# Patient Record
Sex: MALE | Race: WHITE | Employment: OTHER | URBAN - METROPOLITAN AREA
[De-identification: names, ages, dates, MRNs, and addresses within clinical notes are randomized per-mention and may not be internally consistent; named-entity substitution may affect disease eponyms.]

---

## 2017-01-06 ENCOUNTER — HOSPITAL ENCOUNTER (OUTPATIENT)
Dept: CT IMAGING | Age: 62
Discharge: HOME | End: 2017-01-06
Attending: FAMILY MEDICINE
Payer: MEDICARE

## 2017-01-06 DIAGNOSIS — R05.9 COUGH: ICD-10-CM

## 2017-01-06 PROCEDURE — 71250 CT THORAX DX C-: CPT

## 2019-05-14 ENCOUNTER — HOSPITAL ENCOUNTER (OUTPATIENT)
Dept: PHYSICAL THERAPY | Age: 64
Discharge: HOME | End: 2019-05-14
Payer: MEDICARE

## 2019-05-14 PROCEDURE — 97166 OT EVAL MOD COMPLEX 45 MIN: CPT

## 2019-05-14 PROCEDURE — 97140 MANUAL THERAPY 1/> REGIONS: CPT

## 2019-05-14 NOTE — THERAPY EVALUATION
Chelsey Victor  : 1955  [place]  [place] [address] [facility]  [address], [address]  [phone]   [phone]         OUTPATIENT OCCUPATIONAL THERAPY: Initial Assessment and Daily Note 2019    ICD-10: Treatment Diagnosis: I89.0, Lymphedema not elsewhere classified  Q82.0, Hereditary or primary lymphedema  M79.604, Pain in right leg    Precautions/Allergies:   Norvasc [amlodipine] and Other medication   MD Orders: eval and treat MEDICAL/REFERRING DIAGNOSIS:   Lymphedema, not elsewhere classified [I89.0]   DATE OF ONSET: Chronic   REFERRING PHYSICIAN: Elton Chance MD  RETURN PHYSICIAN APPOINTMENT: to be determined      INITIAL ASSESSMENT:  Mr. Sheba Stevenson presents bilateral lower extremity lymphedema with hyperpigmentation, lymphostatic fibrosis, 3+ pitting edema, mild hyperkeratosis. Pt's swelling has increased since April and he is unable to wear shoes. Mr. Sheba Stevenson has had intermittent swelling for many years and is now developing tissue changes. No history of cellulitis or lymphorrhea. He has history of bilateral total knee replacements in , with new onset of right knee pain. He has seen a vascular surgeon and is negative for DVT and negative for CHF. He has been wearing Ready Wraps on his legs; however, the wraps are ill-fitting. He is an excellent candidate for complete decongestive therapy. Pt is in agreement with POC and goals. PLAN OF CARE:   PROBLEM LIST:  1. Increased Pain  2. Decreased Activity Tolerance  3. Decreased Flexibility/Joint Mobility  4. Decreased Knowledge of Precautions  5. Decreased Skin Integrity/Hygeine  6. Decreased [place] with Home Exercise Program INTERVENTIONS PLANNED  1. Skin care  2. Compression bandaging  3. Fitting for compression garment(s)  4. Manual therapy/Manual lymph drainage  5.   Therapeutic exercise/Therapeutic activities  6. Patient Education  7. Compression pump trial prn  8.  kinesiotaping    TREATMENT PLAN:  Effective Dates: 5/14/2019 TO 8/13/2019 (90 days). Frequency/Duration: 2 times a week for 90 Day(s)  Will adjust frequency and duration as progress indicates. GOALS: (Goals have been discussed and agreed upon with patient.)  Short-Term Functional Goals: Time Frame: 45 days  1. The patient/caregiver will verbalize understanding of lymphedema precautions. 2. Patient will be independent with skin care regimen to decrease risk of cellulitis. 3. The patient/caregiver will be independent at donning and doffing  lower extremity compression bandages. 4. The patient/caregiver will be independent with self-manual lymph drainage techniques and show decrease in limb volume. 5. Patient will be independent in lymphatic exercises. Discharge Goals: Time Frame: 90 days  1. Patient's bilateral lower extremity circumferential measurements will decrease 5% on volumetric graph to maximize functional use in ADL's.    2. The patient/caregiver will be independent with home management of lymphedema. 3. Patient/caregiver will be independent donning and doffing bilateral lower extremity compression garment. Rehabilitation Potential For Stated Goals: [address] therapy, I certify that the treatment plan above will be carried out by a therapist or under their direction. Thank you for this referral,  ELICEO Ruiz/L, CLT    Referring Physician Signature: Radha Gusman MD _________________________  Date _________            The information in this section was collected on 5/14/2019   (except where otherwise noted). OCCUPATIONAL PROFILE & HISTORY:   History of Present Injury/Illness (Reason for Referral):   Mr. Dev Bowie presents bilateral lower extremity lymphedema with hyperpigmentation, lymphostatic fibrosis, 3+ pitting edema, mild hyperkeratosis.   He has history of bilateral total knee replacements in 2010, with new onset of right knee pain. He has seen a vascular surgeon and is negative for DVT and negative for CHF. Mr. Elpidio Cameron has had intermittent swelling for many years and is now developing tissue changes  Past Medical History/Comorbidities:   Mr. Elpidio Cameron  has a past medical history of Arthritis, Asthma, Cancer ([address] (2000), COPD, Hypertension, Morbid obesity ([address] and Other ill-defined conditions. He also has no past medical history of Arrhythmia, Autoimmune disease ([address] CAD (coronary artery disease), Chronic kidney disease, Chronic pain, Coagulation defects, Dementia, Diabetes ([address] Difficult intubation, Endocrine disease, GERD (gastroesophageal reflux disease), Heart failure ([address] Infectious disease, Liver disease, Malignant hyperthermia due to anesthesia, Nausea & vomiting, Pseudocholinesterase deficiency, Psychiatric disorder, PUD (peptic ulcer disease), Seizures ([address] Stroke ([address] Thromboembolus ([address] Thyroid disease, Unspecified adverse effect of anesthesia, or Unspecified sleep apnea. Mr. Elpidio Cameron  has a past surgical history that includes hx other surgical; hx heent (2000); hx heent; hx orthopaedic (7-2010); hx hernia repair; and hx cholecystectomy (2001). Social History/Living Environment:    Pt lives with his wife  Prior Level of Function/Work/Activity:  Retired, independent with ADLS/IADLs  Dominant Side:         RIGHT  Previous Treatment Approaches:          Lasix and Ready Wraps; no therapy for lymphedema   Ambulatory/Rehab Services H2 Model Falls Risk Assessment    Risk Factors:       (1)  Gender [Male] Ability to Rise from Chair:       (1)  Pushes up, successful in one attempt    Falls Prevention Plan:       No modifications necessary   Total: (5 or greater = High Risk): 2    ©2010 [facility] States Patent #[id number].  Federal Law prohibits the replication, distribution or use without written permission from [facility] [employer] [facility]     Current Medications:    Current Outpatient Medications:     HYDROcodone-ibuprofen (VICOPROFEN) 5-200 mg per tablet, Take 1 Tab by mouth every six (6) hours as needed. , Disp: , Rfl:     lidocaine (LIDODERM) 5 %(700 mg/patch), 1 Patch by TransDERmal route every twenty-four (24) hours. , Disp: , Rfl:     Diclofenac Sodium (VOLTAREN) 1 % topical gel, Apply 4 g to affected area four (4) times daily as needed. , Disp: , Rfl:     acetaminophen (TYLENOL ARTHRITIS PAIN) 650 mg CR tablet, Take 650 mg by mouth every six (6) hours as needed. Indications: BACK PAIN, Disp: , Rfl:     tramadol-acetaminophen (ULTRACET) 37.5-325 mg per tablet, Take 2 Tabs by mouth every four (4) hours as needed. , Disp: , Rfl:     irbesartan (AVAPRO) 300 mg tablet, Take 300 mg by mouth every morning. Per anesthesia protocol:instructed to take am of surgery. , Disp: , Rfl:     ALBUTEROL IN, Take 2 Puffs by inhalation as needed. , Disp: , Rfl:     albuterol sulfate (PROVENTIL;VENTOLIN) 2.5 mg/0.5 mL Nebu, 2.5 mg by Nebulization route once., Disp: , Rfl:     montelukast (SINGULAIR) 10 mg tablet, Take 10 mg by mouth nightly., Disp: , Rfl:     ezetimibe (ZETIA) 10 mg tablet, Take 1 Tab by mouth every morning. Per anesthesia protocol:instructed to take am of surgery. , Disp: , Rfl:     THEOPHYLLINE ANHYDROUS (THEOPHYLLINE PO), Take 300 mg by mouth three (3) times daily. Per anesthesia protocol:instructed to take am of surgery. , Disp: , Rfl:             Date Last Reviewed:  5/14/2019     Complexity Level : Expanded review of therapy/medical records (1-2):  MODERATE COMPLEXITY   ASSESSMENT OF OCCUPATIONAL PERFORMANCE:   Palpation:          Pitting 3+ edema bilateral feet and ankles  ROM:          WFL  Strength:          Generalized deconditioning  Special Tests:           Positive Stemmer's sign (inability to pinch skin at base of second toe)   Skin Integrity:          Hyperpigmentation, lymphostatic fibrosis, milde hyperkeratosis  Sensation:  Right knee pain  Functional Mobility:  Independent ambulator  Activities of Daily Living Independent  Edema/Girth:  3+ and pitting   PRETREATMENT AFFECTED LIMB(s): right lower extremity  left lower extremity      Date:  5/14/19         Right / Left           Groin   []      []           8 inches   []      []           4 inches   []      []         PoplitealSpace   [x]      [x] 43.5/44.5          8 inches   [x]      [x] 45.5/48.5          4 inches   [x]      [x] 39/42          Ankle   [x]      [x] 34.5/33.5          Instep   [x]      [x] 26.5/27.5        Measurements are taken in centimeters:  2.54 cm = 1 inch  Cumulative Circumferential Volumetric Graph Measurements  RIGHT  cm        LEFT  cm               Physical Skills Involved:  1. Edema  2. Skin Integrity  3. Limb heaviness Cognitive Skills Affected (resulting in the inability to perform in a timely and safe manner):  1. N/A Psychosocial Skills Affected:  1. Habits/Routines   Number of elements that affect the Plan of Care: 3-5:  MODERATE COMPLEXITY   CLINICAL DECISION MAKING:   Outcome Measure: Tool Used: Lymphedema Life Impact Scale   Score:  Initial: 15 Most Recent: X (Date: -- )   Interpretation of Score: The Lymphedema Life Impact Scale (LLIS) is a validated instrument that measures the physical, functional, and psychosocial concerns pertinent to patients with extremity lymphedema. The Scale's questionnaire is administered to patients to gauge impairments, activity limitations, and participation restrictions resulting from their lymphedema. Score 0 1-13 14-26 27-40 41-54 55-67 68   Modifier CH CI CJ CK CL CM CN     ?  Other PT/OT Primary Functional Limitations:     - CURRENT STATUS: CJ - 20%-39% impaired, limited or restricted    - GOAL STATUS: CI - 1%-19% impaired, limited or restricted    - D/C STATUS:  ---------------To be determined---------------       Medical Necessity:   · Patient is expected to demonstrate progress in reduction of circumferential volumetric graph measurements to reduce risk of cellulitis. Reason for Services/Other Comments:  · Patient continues to require skilled intervention due to stage 2 primary lymphedema bilateral lower extremeities. Use of outcome tool(s) and clinical judgement create a POC that gives a: Questionable prediction of patient's progress: MODERATE COMPLEXITY   TREATMENT:   (In addition to Assessment/Re-Assessment sessions the following treatments were rendered)    Pre-treatment Symptoms/Complaints:  Swelling in bilateral legs, tissue changes, unable to wear shoes, right knee pain. Pain: Initial:   Pain Intensity 1: 10  Post Session:  10   Occupational Therapy Treatments:    OT eval( X  ) OT eval was completed. Pt received information on lymphedema and risk reduction/self management practices as outlined by the National Lymphed[employer] Network. Therapeutic Exercise ( minutes):     HEP:  As above; handouts given to patient for all exercises. Manual Therapy:( 30 minutes)   Pt was educated on lymphatic pathophysiology, complete decongestive therapy, precautions and skin integrity management. Manual Lymph Drainage:          Lymph Nodes:    Cervical Supraclavicular Axillary Abdominal Inguinal Popliteal Antecubital   RIGHT []     []     []     []     []     []     []       LEFT []     []     []     []     []     []     []          Anastamoses:   Axillo-axillary Inguino-inguinal Axillo-inguinal Inguino-axillary   ANTERIOR []     []     []     []       POSTERIOR []     []     []     []       RIGHT []     []     []     []       LEFT []     []     []     []         Limbs:   []    RUE     []    LUE     []    RLE    []    LLE   Compression: Provided surgigrip stockinette for light compression and placed Ready Wraps over surgigrp.   Pt has class 1 compression hose but is unable to don them due to increased size of legs.    Treatment/Session Assessment:    · Response to Treatment:  . In agreement with POC and goals  · Compliance with Program/Exercises: Will assess as treatment progresses. · Recommendations/Intent for next treatment session: Next visit will focus on phase 1 complete decongestive therapy.   Total Treatment Duration:  60 minutes  OT Patient Time In/Time Out  Time In: 0900  Time Out: 1000    Олег Tellez OTR/L, CLT

## 2019-05-15 ENCOUNTER — APPOINTMENT (OUTPATIENT)
Dept: PHYSICAL THERAPY | Age: 64
End: 2019-05-15
Payer: MEDICARE

## 2019-05-20 ENCOUNTER — HOSPITAL ENCOUNTER (OUTPATIENT)
Dept: PHYSICAL THERAPY | Age: 64
Discharge: HOME | End: 2019-05-20
Payer: MEDICARE

## 2019-05-20 PROCEDURE — 97140 MANUAL THERAPY 1/> REGIONS: CPT

## 2019-05-20 NOTE — PROGRESS NOTES
Warren Costello  : 1955  [place]  [place]: [place] [address] at [facility]  [address], [address]  [phone]   [phone]         OUTPATIENT OCCUPATIONAL THERAPY: Daily Note 2019    ICD-10: Treatment Diagnosis: I89.0, Lymphedema not elsewhere classified  Q82.0, Hereditary or primary lymphedema  M79.604, Pain in right leg    Precautions/Allergies:   Norvasc [amlodipine] and Other medication   MD Orders: eval and treat MEDICAL/REFERRING DIAGNOSIS:   Lymphedema, not elsewhere classified [I89.0]   DATE OF ONSET: Chronic   REFERRING PHYSICIAN: Brant Begum MD  RETURN PHYSICIAN APPOINTMENT: to be determined      INITIAL ASSESSMENT:  Mr. Nadeem Topete presents bilateral lower extremity lymphedema with hyperpigmentation, lymphostatic fibrosis, 3+ pitting edema, mild hyperkeratosis. Pt's swelling has increased since April and he is unable to wear shoes. Mr. Ndaeem Topete has had intermittent swelling for many years and is now developing tissue changes. No history of cellulitis or lymphorrhea. He has history of bilateral total knee replacements in , with new onset of right knee pain. He has seen a vascular surgeon and is negative for DVT and negative for CHF. He has been wearing Ready Wraps on his legs; however, the wraps are ill-fitting. He is an excellent candidate for complete decongestive therapy. Pt is in agreement with POC and goals. PLAN OF CARE:   PROBLEM LIST:  1. Increased Pain  2. Decreased Activity Tolerance  3. Decreased Flexibility/Joint Mobility  4. Decreased Knowledge of Precautions  5. Decreased Skin Integrity/Hygeine  6. Decreased Schleicher with Home Exercise Program INTERVENTIONS PLANNED  1. Skin care  2. Compression bandaging  3. Fitting for compression garment(s)  4. Manual therapy/Manual lymph drainage  5.   Therapeutic exercise/Therapeutic activities  6. Patient Education  7. Compression pump trial prn  8.  kinesiotaping    TREATMENT PLAN:  Effective Dates: 5/14/2019 TO 8/13/2019 (90 days). Frequency/Duration: 2 times a week for 90 Day(s)  Will adjust frequency and duration as progress indicates. GOALS: (Goals have been discussed and agreed upon with patient.)  Short-Term Functional Goals: Time Frame: 45 days  1. The patient/caregiver will verbalize understanding of lymphedema precautions. 2. Patient will be independent with skin care regimen to decrease risk of cellulitis. 3. The patient/caregiver will be independent at donning and doffing  lower extremity compression bandages. 4. The patient/caregiver will be independent with self-manual lymph drainage techniques and show decrease in limb volume. 5. Patient will be independent in lymphatic exercises. Discharge Goals: Time Frame: 90 days  1. Patient's bilateral lower extremity circumferential measurements will decrease 5% on volumetric graph to maximize functional use in ADL's.    2. The patient/caregiver will be independent with home management of lymphedema. 3. Patient/caregiver will be independent donning and doffing bilateral lower extremity compression garment. Rehabilitation Potential For Stated Goals: Fair              The information in this section was collected on 5/20/2019   (except where otherwise noted). OCCUPATIONAL PROFILE & HISTORY:   History of Present Injury/Illness (Reason for Referral):   Mr. Lili Solano presents bilateral lower extremity lymphedema with hyperpigmentation, lymphostatic fibrosis, 3+ pitting edema, mild hyperkeratosis. He has history of bilateral total knee replacements in 2010, with new onset of right knee pain. He has seen a vascular surgeon and is negative for DVT and negative for CHF.   Mr. Lili Solano has had intermittent swelling for many years and is now developing tissue changes  Past Medical History/Comorbidities:   Mr. Lili Solano has a past medical history of Arthritis, Asthma, Cancer ([facility] [address] (2000), COPD, Hypertension, Morbid obesity ([address] and Other ill-defined conditions. He also has no past medical history of Arrhythmia, Autoimmune disease ([address] CAD (coronary artery disease), Chronic kidney disease, Chronic pain, Coagulation defects, Dementia, Diabetes ([address] Difficult intubation, Endocrine disease, GERD (gastroesophageal reflux disease), Heart failure ([address] Infectious disease, Liver disease, Malignant hyperthermia due to anesthesia, Nausea & vomiting, Pseudocholinesterase deficiency, Psychiatric disorder, PUD (peptic ulcer disease), Seizures ([address] Stroke ([address] Thromboembolus ([facility] [address] Thyroid disease, Unspecified adverse effect of anesthesia, or Unspecified sleep apnea. Mr. Nadeem Topete  has a past surgical history that includes hx other surgical; hx heent (2000); hx heent; hx orthopaedic (7-2010); hx hernia repair; and hx cholecystectomy (2001). Social History/Living Environment:    Pt lives with his wife  Prior Level of Function/Work/Activity:  Retired, independent with ADLS/IADLs  Dominant Side:         RIGHT  Previous Treatment Approaches:          Lasix and Ready Wraps; no therapy for lymphedema   Ambulatory/Rehab Services H2 Model Falls Risk Assessment    Risk Factors:       (1)  Gender [Male] Ability to Rise from Chair:       (1)  Pushes up, successful in one attempt    Falls Prevention Plan:       No modifications necessary   Total: (5 or greater = High Risk): 2    ©2010 [facility] of [address] Patent #[id number]. Federal Law prohibits the replication, distribution or use without written permission from [facility] of [employer]     Current Medications:    Current Outpatient Medications:     HYDROcodone-ibuprofen (VICOPROFEN) 5-200 mg per tablet, Take 1 Tab by mouth every six (6) hours as needed.   , Disp: , Rfl:     lidocaine (LIDODERM) 5 %(700 mg/patch), 1 Patch by TransDERmal route every twenty-four (24) hours. , Disp: , Rfl:     Diclofenac Sodium (VOLTAREN) 1 % topical gel, Apply 4 g to affected area four (4) times daily as needed. , Disp: , Rfl:     acetaminophen (TYLENOL ARTHRITIS PAIN) 650 mg CR tablet, Take 650 mg by mouth every six (6) hours as needed. Indications: BACK PAIN, Disp: , Rfl:     tramadol-acetaminophen (ULTRACET) 37.5-325 mg per tablet, Take 2 Tabs by mouth every four (4) hours as needed. , Disp: , Rfl:     irbesartan (AVAPRO) 300 mg tablet, Take 300 mg by mouth every morning. Per anesthesia protocol:instructed to take am of surgery. , Disp: , Rfl:     ALBUTEROL IN, Take 2 Puffs by inhalation as needed. , Disp: , Rfl:     albuterol sulfate (PROVENTIL;VENTOLIN) 2.5 mg/0.5 mL Nebu, 2.5 mg by Nebulization route once., Disp: , Rfl:     montelukast (SINGULAIR) 10 mg tablet, Take 10 mg by mouth nightly., Disp: , Rfl:     ezetimibe (ZETIA) 10 mg tablet, Take 1 Tab by mouth every morning. Per anesthesia protocol:instructed to take am of surgery. , Disp: , Rfl:     THEOPHYLLINE ANHYDROUS (THEOPHYLLINE PO), Take 300 mg by mouth three (3) times daily. Per anesthesia protocol:instructed to take am of surgery. , Disp: , Rfl:             Date Last Reviewed:  5/20/2019     Complexity Level : Expanded review of therapy/medical records (1-2):  MODERATE COMPLEXITY   ASSESSMENT OF OCCUPATIONAL PERFORMANCE:   Palpation:          Pitting 3+ edema bilateral feet and ankles  ROM:          WFL  Strength:          Generalized deconditioning  Special Tests:           Positive Stemmer's sign (inability to pinch skin at base of second toe)   Skin Integrity:          Hyperpigmentation, lymphostatic fibrosis, milde hyperkeratosis  Sensation:  Right knee pain  Functional Mobility:  Independent ambulator  Activities of Daily Living Independent  Edema/Girth:  3+ and pitting   PRETREATMENT AFFECTED LIMB(s): right lower extremity  left lower extremity      Date:  5/14/19         Right / Left Groin   []      []           8 inches   []      []           4 inches   []      []         PoplitealSpace   [x]      [x] 43.5/44.5          8 inches   [x]      [x] 45.5/48.5          4 inches   [x]      [x] 39/42          Ankle   [x]      [x] 34.5/33.5          Instep   [x]      [x] 26.5/27.5        Measurements are taken in centimeters:  2.54 cm = 1 inch  Cumulative Circumferential Volumetric Graph Measurements  RIGHT  cm        LEFT  cm               Physical Skills Involved:  1. Edema  2. Skin Integrity  3. Limb heaviness Cognitive Skills Affected (resulting in the inability to perform in a timely and safe manner):  1. N/A Psychosocial Skills Affected:  1. Habits/Routines   Number of elements that affect the Plan of Care: 3-5:  MODERATE COMPLEXITY   CLINICAL DECISION MAKING:   Outcome Measure: Tool Used: Lymphedema Life Impact Scale   Score:  Initial: 15 Most Recent: X (Date: -- )   Interpretation of Score: The Lymphedema Life Impact Scale (LLIS) is a validated instrument that measures the physical, functional, and psychosocial concerns pertinent to patients with extremity lymphedema. The Scale's questionnaire is administered to patients to gauge impairments, activity limitations, and participation restrictions resulting from their lymphedema. Score 0 1-13 14-26 27-40 41-54 55-67 68   Modifier CH CI CJ CK CL CM CN     ? Other PT/OT Primary Functional Limitations:     - CURRENT STATUS: CJ - 20%-39% impaired, limited or restricted    - GOAL STATUS: CI - 1%-19% impaired, limited or restricted    - D/C STATUS:  ---------------To be determined---------------       Medical Necessity:   · Patient is expected to demonstrate progress in reduction of circumferential volumetric graph measurements to reduce risk of cellulitis. Reason for Services/Other Comments:  · Patient continues to require skilled intervention due to stage 2 primary lymphedema bilateral lower extremeities.    Use of outcome tool(s) and clinical judgement create a POC that gives a: Questionable prediction of patient's progress: MODERATE COMPLEXITY   TREATMENT:   (In addition to Assessment/Re-Assessment sessions the following treatments were rendered)    Pre-treatment Symptoms/Complaints:  Initiated CDT today; pt resistant to wearing compression bandages; questionable compliance. Pt c/o surgigrip stockinette rolling over his feet when wearing with Ready Wraps last week and cut them to fit on his calves only. Pain: Initial:   Pain Intensity 1: 10  Post Session:  10   Occupational Therapy Treatments:    Therapeutic Exercise ( minutes):  Recommended walking with compression on, to facilitate lymphatic pumping of his legs. Manual Therapy:( 60 minutes)   Manual lymphatic drainage;application of Eucerin lotion, multilayer compression bandaging with toe wraps. Manual Lymph Drainage:          Lymph Nodes:    Cervical Supraclavicular Axillary Abdominal Inguinal Popliteal Antecubital   RIGHT [x]     [x]     [x]     []     [x]     [x]     []       LEFT [x]     [x]     [x]     []     [x]     [x]     []          Anastamoses:   Axillo-axillary Inguino-inguinal Axillo-inguinal Inguino-axillary   ANTERIOR []     []     []     [x]       POSTERIOR []     []     []     []       RIGHT []     []     []     [x]       LEFT []     []     []     [x]         Limbs:   []    RUE     []    LUE     []    RLE    []    LLE   Compression: Toe wraps bilaterally; cotton stockinette applied, lymphsoft foam over gaiters, 3 short stretch bandages. Pt was not comfortable wearing his shoes. Therapist suggested he go to [facility] and find something he could wear with bandages on; suggested water shoes, bedroom shoes, Crocs. Pt stated he was not going to wear shoes at all. Therapist offered surgical shoe covers to put over bandages; pt refused. He left without shoes on his feet.   Instructed patient to remove bandages if having pain or shortness of breath; otherwise, leave on until next treatment session day (Thursday) and shower prior to appointment. Pt verbalized understanding. Therapist recommended vinyl cast covers for his legs ([facility]) to shower and protect bandages. Treatment/Session Assessment:    · Response to Treatment:  . In agreement with POC and goals. Pt resistant to compression; does not agree with any suggestions or recommendations to manage this process. · Compliance with Program/Exercises: Will assess as treatment progresses. · Recommendations/Intent for next treatment session: Next visit will focus on phase 1 complete decongestive therapy.   Total Treatment Duration:  60 minutes  OT Patient Time In/Time Out  Time In: 0900  Time Out: 1000    ELICEO Montana/L, CLT

## 2019-05-23 ENCOUNTER — HOSPITAL ENCOUNTER (OUTPATIENT)
Dept: PHYSICAL THERAPY | Age: 64
Discharge: HOME | End: 2019-05-23
Payer: MEDICARE

## 2019-05-23 NOTE — PROGRESS NOTES
Edmundo Joaquin  : 1955  [place]  [place] [address] at [facility]  [address], [facility] [address]  [phone]   [phone]        OUTPATIENT DAILY NOTE    NAME/AGE/GENDER: Edmundo Joaquin is a 70 y.o. male. DATE: 2019    Patient called and cancelled all further appointments. He will be discharged from service.     Minh De Oliveira, OTR/L, CLT

## 2019-05-28 ENCOUNTER — APPOINTMENT (OUTPATIENT)
Dept: PHYSICAL THERAPY | Age: 64
End: 2019-05-28
Payer: MEDICARE

## 2019-05-30 ENCOUNTER — APPOINTMENT (OUTPATIENT)
Dept: PHYSICAL THERAPY | Age: 64
End: 2019-05-30
Payer: MEDICARE

## 2019-06-03 ENCOUNTER — APPOINTMENT (OUTPATIENT)
Dept: PHYSICAL THERAPY | Age: 64
End: 2019-06-03

## 2019-06-06 ENCOUNTER — APPOINTMENT (OUTPATIENT)
Dept: PHYSICAL THERAPY | Age: 64
End: 2019-06-06

## 2019-06-10 ENCOUNTER — APPOINTMENT (OUTPATIENT)
Dept: PHYSICAL THERAPY | Age: 64
End: 2019-06-10

## 2019-06-13 ENCOUNTER — APPOINTMENT (OUTPATIENT)
Dept: PHYSICAL THERAPY | Age: 64
End: 2019-06-13

## 2019-06-17 ENCOUNTER — APPOINTMENT (OUTPATIENT)
Dept: PHYSICAL THERAPY | Age: 64
End: 2019-06-17

## 2019-06-20 ENCOUNTER — APPOINTMENT (OUTPATIENT)
Dept: PHYSICAL THERAPY | Age: 64
End: 2019-06-20

## 2019-08-30 NOTE — THERAPY DISCHARGE
Cecilia Maria T  : 1955  [place]  [place]: [place] [address] at [address]  [address], [address]  [phone]   [phone]         OUTPATIENT OCCUPATIONAL THERAPY: Discharge 2019    ICD-10: Treatment Diagnosis: I89.0, Lymphedema not elsewhere classified  Q82.0, Hereditary or primary lymphedema  M79.604, Pain in right leg    Precautions/Allergies:   Norvasc [amlodipine] and Other medication   MD Orders: eval and treat MEDICAL/REFERRING DIAGNOSIS:   Lymphedema, not elsewhere classified [I89.0]   DATE OF ONSET: Chronic   REFERRING PHYSICIAN: Evelyn Holt MD  RETURN PHYSICIAN APPOINTMENT: to be determined      DISCHARGE 19: called to cancel all further therapy appointments. He completed 2 treatment sessions:   and 19. Pt discharged from services. INITIAL ASSESSMENT:  Mr. Maxime Goins presents bilateral lower extremity lymphedema with hyperpigmentation, lymphostatic fibrosis, 3+ pitting edema, mild hyperkeratosis. Pt's swelling has increased since April and he is unable to wear shoes. Mr. Maxime Goins has had intermittent swelling for many years and is now developing tissue changes. No history of cellulitis or lymphorrhea. He has history of bilateral total knee replacements in , with new onset of right knee pain. He has seen a vascular surgeon and is negative for DVT and negative for CHF. He has been wearing Ready Wraps on his legs; however, the wraps are ill-fitting. He is an excellent candidate for complete decongestive therapy. Pt is in agreement with POC and goals. PLAN OF CARE:   PROBLEM LIST:  1. Increased Pain  2. Decreased Activity Tolerance  3. Decreased Flexibility/Joint Mobility  4. Decreased Knowledge of Precautions  5. Decreased Skin Integrity/Hygeine  6. Decreased Jewell with Home Exercise Program INTERVENTIONS PLANNED  1.   Skin care  2. Compression bandaging  3. Fitting for compression garment(s)  4. Manual therapy/Manual lymph drainage  5. Therapeutic exercise/Therapeutic activities  6. Patient Education  7. Compression pump trial prn  8.  kinesiotaping    TREATMENT PLAN:  Effective Dates: 5/14/2019 TO 8/13/2019 (90 days). Frequency/Duration: 2 times a week for 90 Day(s)  Will adjust frequency and duration as progress indicates. GOALS: (Goals have been discussed and agreed upon with patient.)  Short-Term Functional Goals: Time Frame: 45 days  1. The patient/caregiver will verbalize understanding of lymphedema precautions. Goal met  2. Patient will be independent with skin care regimen to decrease risk of cellulitis. Goal met  3. The patient/caregiver will be independent at donning and doffing  lower extremity compression bandages. Not met  4. The patient/caregiver will be independent with self-manual lymph drainage techniques and show decrease in limb volume. Not met  5. Patient will be independent in lymphatic exercises. Not met  Discharge Goals: Time Frame: 90 days  1. Patient's bilateral lower extremity circumferential measurements will decrease 5% on volumetric graph to maximize functional use in ADL's. Not met  2. The patient/caregiver will be independent with home management of lymphedema. Not met  3. Patient/caregiver will be independent donning and doffing bilateral lower extremity compression garment.  Not met    Rehabilitation Potential For Stated Goals: Fair

## 2024-06-28 ENCOUNTER — HOSPITAL ENCOUNTER (EMERGENCY)
Age: 69
Discharge: HOME | End: 2024-06-28
Attending: EMERGENCY MEDICINE
Payer: MEDICARE

## 2024-06-28 ENCOUNTER — APPOINTMENT (OUTPATIENT)
Dept: GENERAL RADIOLOGY | Age: 69
End: 2024-06-28
Payer: MEDICARE

## 2024-06-28 VITALS
BODY MASS INDEX: 28.63 KG/M2 | HEIGHT: 70 IN | WEIGHT: 200 LBS | OXYGEN SATURATION: 97 % | RESPIRATION RATE: 15 BRPM | DIASTOLIC BLOOD PRESSURE: 65 MMHG | TEMPERATURE: 99 F | HEART RATE: 99 BPM | SYSTOLIC BLOOD PRESSURE: 109 MMHG

## 2024-06-28 DIAGNOSIS — U07.1 COVID-19: Primary | ICD-10-CM

## 2024-06-28 LAB
ALBUMIN SERPL-MCNC: 4.2 G/DL (ref 3.2–4.6)
ALBUMIN/GLOB SERPL: 1.4 (ref 1–1.9)
ALP SERPL-CCNC: 102 U/L (ref 40–129)
ALT SERPL-CCNC: 22 U/L (ref 12–65)
ANION GAP SERPL CALC-SCNC: 17 MMOL/L (ref 9–18)
AST SERPL-CCNC: 22 U/L (ref 15–37)
BASOPHILS # BLD: 0 K/UL (ref 0–0.2)
BASOPHILS NFR BLD: 0 % (ref 0–2)
BILIRUB SERPL-MCNC: 0.4 MG/DL (ref 0–1.2)
BUN SERPL-MCNC: 25 MG/DL (ref 8–23)
CALCIUM SERPL-MCNC: 9.5 MG/DL (ref 8.8–10.2)
CHLORIDE SERPL-SCNC: 99 MMOL/L (ref 98–107)
CO2 SERPL-SCNC: 24 MMOL/L (ref 20–28)
CREAT SERPL-MCNC: 1.39 MG/DL (ref 0.8–1.3)
DIFFERENTIAL METHOD BLD: ABNORMAL
EOSINOPHIL # BLD: 0 K/UL (ref 0–0.8)
EOSINOPHIL NFR BLD: 0 % (ref 0.5–7.8)
ERYTHROCYTE [DISTWIDTH] IN BLOOD BY AUTOMATED COUNT: 13.2 % (ref 11.9–14.6)
GLOBULIN SER CALC-MCNC: 3 G/DL (ref 2.3–3.5)
GLUCOSE SERPL-MCNC: 111 MG/DL (ref 70–99)
HCT VFR BLD AUTO: 40.2 % (ref 41.1–50.3)
HGB BLD-MCNC: 13.1 G/DL (ref 13.6–17.2)
IMM GRANULOCYTES # BLD AUTO: 0 K/UL (ref 0–0.5)
IMM GRANULOCYTES NFR BLD AUTO: 1 % (ref 0–5)
LACTATE SERPL-SCNC: 1.7 MMOL/L (ref 0.5–2)
LYMPHOCYTES # BLD: 1.3 K/UL (ref 0.5–4.6)
LYMPHOCYTES NFR BLD: 19 % (ref 13–44)
MCH RBC QN AUTO: 31.3 PG (ref 26.1–32.9)
MCHC RBC AUTO-ENTMCNC: 32.6 G/DL (ref 31.4–35)
MCV RBC AUTO: 95.9 FL (ref 82–102)
MONOCYTES # BLD: 1 K/UL (ref 0.1–1.3)
MONOCYTES NFR BLD: 14 % (ref 4–12)
NEUTS SEG # BLD: 4.6 K/UL (ref 1.7–8.2)
NEUTS SEG NFR BLD: 66 % (ref 43–78)
NRBC # BLD: 0 K/UL (ref 0–0.2)
PLATELET # BLD AUTO: 216 K/UL (ref 150–450)
PMV BLD AUTO: 9.3 FL (ref 9.4–12.3)
POTASSIUM SERPL-SCNC: 3.9 MMOL/L (ref 3.5–5.1)
PROCALCITONIN SERPL-MCNC: 0.07 NG/ML (ref 0–0.1)
PROT SERPL-MCNC: 7.2 G/DL (ref 6.3–8.2)
RBC # BLD AUTO: 4.19 M/UL (ref 4.23–5.6)
SARS-COV-2 RDRP RESP QL NAA+PROBE: DETECTED
SODIUM SERPL-SCNC: 140 MMOL/L (ref 136–145)
SOURCE: ABNORMAL
STREP, MOLECULAR: NOT DETECTED
WBC # BLD AUTO: 7 K/UL (ref 4.3–11.1)

## 2024-06-28 PROCEDURE — 87635 SARS-COV-2 COVID-19 AMP PRB: CPT

## 2024-06-28 PROCEDURE — 99285 EMERGENCY DEPT VISIT HI MDM: CPT

## 2024-06-28 PROCEDURE — 87040 BLOOD CULTURE FOR BACTERIA: CPT

## 2024-06-28 PROCEDURE — 83605 ASSAY OF LACTIC ACID: CPT

## 2024-06-28 PROCEDURE — 85025 COMPLETE CBC W/AUTO DIFF WBC: CPT

## 2024-06-28 PROCEDURE — 84145 PROCALCITONIN (PCT): CPT

## 2024-06-28 PROCEDURE — 80053 COMPREHEN METABOLIC PANEL: CPT

## 2024-06-28 PROCEDURE — 96375 TX/PRO/DX INJ NEW DRUG ADDON: CPT

## 2024-06-28 PROCEDURE — 87651 STREP A DNA AMP PROBE: CPT

## 2024-06-28 PROCEDURE — 6370000000 HC RX 637 (ALT 250 FOR IP): Performed by: EMERGENCY MEDICINE

## 2024-06-28 PROCEDURE — 96374 THER/PROPH/DIAG INJ IV PUSH: CPT

## 2024-06-28 PROCEDURE — 94640 AIRWAY INHALATION TREATMENT: CPT

## 2024-06-28 PROCEDURE — 71045 X-RAY EXAM CHEST 1 VIEW: CPT

## 2024-06-28 PROCEDURE — 6360000002 HC RX W HCPCS: Performed by: EMERGENCY MEDICINE

## 2024-06-28 RX ORDER — DEXAMETHASONE SODIUM PHOSPHATE 10 MG/ML
8 INJECTION INTRAMUSCULAR; INTRAVENOUS ONCE
Status: COMPLETED | OUTPATIENT
Start: 2024-06-28 | End: 2024-06-28

## 2024-06-28 RX ORDER — IPRATROPIUM BROMIDE AND ALBUTEROL SULFATE 2.5; .5 MG/3ML; MG/3ML
1 SOLUTION RESPIRATORY (INHALATION) ONCE
Status: COMPLETED | OUTPATIENT
Start: 2024-06-28 | End: 2024-06-28

## 2024-06-28 RX ORDER — PREDNISONE 50 MG/1
50 TABLET ORAL DAILY
Qty: 5 TABLET | Refills: 0 | Status: SHIPPED | OUTPATIENT
Start: 2024-06-28 | End: 2024-07-03

## 2024-06-28 RX ORDER — ONDANSETRON 2 MG/ML
4 INJECTION INTRAMUSCULAR; INTRAVENOUS ONCE
Status: COMPLETED | OUTPATIENT
Start: 2024-06-28 | End: 2024-06-28

## 2024-06-28 RX ADMIN — IPRATROPIUM BROMIDE AND ALBUTEROL SULFATE 1 DOSE: .5; 3 SOLUTION RESPIRATORY (INHALATION) at 06:02

## 2024-06-28 RX ADMIN — DEXAMETHASONE SODIUM PHOSPHATE 8 MG: 10 INJECTION INTRAMUSCULAR; INTRAVENOUS at 05:55

## 2024-06-28 RX ADMIN — ONDANSETRON 4 MG: 2 INJECTION INTRAMUSCULAR; INTRAVENOUS at 05:56

## 2024-06-28 ASSESSMENT — LIFESTYLE VARIABLES
HOW MANY STANDARD DRINKS CONTAINING ALCOHOL DO YOU HAVE ON A TYPICAL DAY: PATIENT DOES NOT DRINK
HOW OFTEN DO YOU HAVE A DRINK CONTAINING ALCOHOL: NEVER
HOW OFTEN DO YOU HAVE A DRINK CONTAINING ALCOHOL: NEVER

## 2024-06-28 NOTE — DISCHARGE INSTRUCTIONS
Take medication as prescribed. Follow up with your doctor. If you get worse, or have any further problems or concerns, return to the emergency department.

## 2024-06-28 NOTE — ED NOTES
Patient mobility status  with difficulty, uses a cane. Provider aware     I have reviewed discharge instructions with the patient.  The patient verbalized understanding.    Patient left ED via Discharge Method: ambulatory to Home with  self .    Opportunity for questions and clarification provided.     Patient given 2 scripts.

## 2024-06-30 LAB
BACTERIA SPEC CULT: NORMAL
BACTERIA SPEC CULT: NORMAL
SERVICE CMNT-IMP: NORMAL
SERVICE CMNT-IMP: NORMAL
